# Patient Record
Sex: FEMALE | Race: WHITE | NOT HISPANIC OR LATINO | Employment: FULL TIME | URBAN - METROPOLITAN AREA
[De-identification: names, ages, dates, MRNs, and addresses within clinical notes are randomized per-mention and may not be internally consistent; named-entity substitution may affect disease eponyms.]

---

## 2024-10-28 ENCOUNTER — OFFICE VISIT (OUTPATIENT)
Dept: OBGYN CLINIC | Facility: CLINIC | Age: 39
End: 2024-10-28

## 2024-10-28 VITALS
HEART RATE: 81 BPM | SYSTOLIC BLOOD PRESSURE: 139 MMHG | HEIGHT: 64 IN | BODY MASS INDEX: 33.43 KG/M2 | DIASTOLIC BLOOD PRESSURE: 94 MMHG | WEIGHT: 195.8 LBS

## 2024-10-28 DIAGNOSIS — F17.200 TOBACCO USE DISORDER: ICD-10-CM

## 2024-10-28 DIAGNOSIS — F10.91 ALCOHOL USE, UNSPECIFIED, IN REMISSION: ICD-10-CM

## 2024-10-28 DIAGNOSIS — F41.1 GENERALIZED ANXIETY DISORDER WITH PANIC ATTACKS: ICD-10-CM

## 2024-10-28 DIAGNOSIS — F12.90 MARIJUANA USE: Primary | ICD-10-CM

## 2024-10-28 DIAGNOSIS — F41.0 GENERALIZED ANXIETY DISORDER WITH PANIC ATTACKS: ICD-10-CM

## 2024-10-28 DIAGNOSIS — F33.1 MDD (MAJOR DEPRESSIVE DISORDER), RECURRENT EPISODE, MODERATE (HCC): ICD-10-CM

## 2024-10-28 DIAGNOSIS — F15.11 HISTORY OF METHAMPHETAMINE ABUSE (HCC): ICD-10-CM

## 2024-10-28 PROCEDURE — NC001 PR NO CHARGE: Performed by: PSYCHIATRY & NEUROLOGY

## 2024-10-28 RX ORDER — BUPROPION HYDROCHLORIDE 300 MG/1
300 TABLET ORAL EVERY MORNING
Qty: 30 TABLET | Refills: 1 | Status: SHIPPED | OUTPATIENT
Start: 2024-11-21 | End: 2025-01-20

## 2024-10-28 RX ORDER — CLONIDINE HYDROCHLORIDE 0.1 MG/1
0.1 TABLET ORAL EVERY 12 HOURS SCHEDULED
Qty: 60 TABLET | Refills: 1 | Status: SHIPPED | OUTPATIENT
Start: 2024-10-28 | End: 2024-12-27
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
## 2024-12-23 ENCOUNTER — OFFICE VISIT (OUTPATIENT)
Dept: OBGYN CLINIC | Facility: CLINIC | Age: 39
End: 2024-12-23

## 2024-12-23 VITALS
WEIGHT: 189.8 LBS | BODY MASS INDEX: 32.4 KG/M2 | SYSTOLIC BLOOD PRESSURE: 143 MMHG | DIASTOLIC BLOOD PRESSURE: 88 MMHG | HEIGHT: 64 IN | HEART RATE: 84 BPM

## 2024-12-23 DIAGNOSIS — F17.200 TOBACCO USE DISORDER: ICD-10-CM

## 2024-12-23 DIAGNOSIS — F10.91 ALCOHOL USE, UNSPECIFIED, IN REMISSION: ICD-10-CM

## 2024-12-23 DIAGNOSIS — F41.0 GENERALIZED ANXIETY DISORDER WITH PANIC ATTACKS: ICD-10-CM

## 2024-12-23 DIAGNOSIS — F41.1 GENERALIZED ANXIETY DISORDER WITH PANIC ATTACKS: ICD-10-CM

## 2024-12-23 DIAGNOSIS — F15.11 HISTORY OF METHAMPHETAMINE ABUSE (HCC): ICD-10-CM

## 2024-12-23 DIAGNOSIS — F33.1 MDD (MAJOR DEPRESSIVE DISORDER), RECURRENT EPISODE, MODERATE (HCC): Primary | ICD-10-CM

## 2024-12-23 DIAGNOSIS — F12.90 MARIJUANA USE: ICD-10-CM

## 2024-12-23 DIAGNOSIS — G47.00 INSOMNIA, UNSPECIFIED TYPE: ICD-10-CM

## 2024-12-23 PROCEDURE — NC001 PR NO CHARGE: Performed by: STUDENT IN AN ORGANIZED HEALTH CARE EDUCATION/TRAINING PROGRAM

## 2024-12-23 RX ORDER — CLONIDINE HYDROCHLORIDE 0.1 MG/1
0.1 TABLET ORAL DAILY
Qty: 30 TABLET | Refills: 1 | Status: SHIPPED | OUTPATIENT
Start: 2024-12-23 | End: 2025-02-21

## 2024-12-23 RX ORDER — HYDROXYZINE PAMOATE 25 MG/1
25 CAPSULE ORAL
Qty: 10 CAPSULE | Refills: 1 | Status: SHIPPED | OUTPATIENT
Start: 2024-12-23

## 2024-12-23 RX ORDER — BUPROPION HYDROCHLORIDE 450 MG/1
450 TABLET, FILM COATED, EXTENDED RELEASE ORAL EVERY MORNING
Qty: 30 TABLET | Refills: 1 | Status: SHIPPED | OUTPATIENT
Start: 2024-12-23 | End: 2025-02-21

## 2024-12-23 NOTE — PROGRESS NOTES
MEDICATION MANAGEMENT NOTE        [facility]      Name and Date of Birth:  Sofia Espinoza 39 y.o. 1985 MRN: [id number]    Date of Visit: December 23, 2024    Reason for Visit: Follow-up visit regarding medication management     _____________________________    Assessment & Plan   Sofia Espinoza is a 39 y.o. female, /[employer] with prior psychiatric diagnoses of ***; ***prior hospitalizations and *** prior suicide attempts, with suicide risk factors including {[facility] op suicide risk factors:45343}; and medical history including ***. Sofia was personally seen and evaluated today at the [facility] outpatient clinic for follow-up regarding medication management.     On assessment, Sofia Espinoza *** Patient is in agreement with the treatment plan as detailed below, and agrees to call the office with any concerns or side effects between appointments.     DSM-5 Diagnoses/Visit Diagnoses:     No diagnosis found.***    Treatment Recommendations/Precautions:  Assessment & Plan     Continue Wellbutrin 300 mg XL for mood, smoking cessation, and off label use for ADHD symptoms. Recommended to continue to monitor BP at home (reports typically 140/80s at home) and follow up with PCP ASAP as well as side effect of increasing blood pressures. Patient continues to work towards avoid drinking alcohol   IBM sent to PCP regarding elevated pressures and potential for Wellbutrin to increase pressures - patient reports monitoring at home and is typically 130s/80s and that she needs to follow up with PCP  PARQ completed including induction of bryson, decreased seizure threshold and risk with alcohol or electrolyte disturbances, headaches, hypertension and cardiovascular effects, GI distress, weight loss, agitation/activation, dizziness, tremor, anxiety, potential for drug interactions, and others.  Recommend Melatonin over the counter 3-5 mg for  "sleep; previously was using 5 mg at rehab for sleep  Increase Clonidine to 0.1 mg BID for anxiety/ADHD symptoms  PARQ completed including sedation, headache, dizziness, hypotension/postural hypotension, and risks associated with sudden discontinuation, among others  Referral to smoking/tobacco cessation program with [facility], advised to contact insurance for coverage  Neuropsychology resources for ADHD testing provided by previous psychiatric provider  Routine labs ordered, follow up  Advised to contact insurance for coverage  Most Recent EKG on 02/06/24: Qtc 453 ms  Medication management follow-up in 8 weeks or sooner if needed.  Continue psychotherapy with own therapist  Aware of need to follow up with family physician for medical issues  Aware of 24 hour and weekend coverage for urgent situations accessed by calling [facility] main practice number  Risks, benefits, and possible side effects of medications explained to Sofia and she verbalizes understanding and agreement for treatment.  Labs most recently obtained 10/08/24, reviewed - scanned into chart  Labs most recently obtained ***, reviewed.   Follow up in *** for medication management  Follow up with PCP for medical issues and ongoing care  {EFO AMB FOLLOW UP RECOMMENDATIONS:37658::\"Aware of 24 hour and weekend coverage for urgent situations accessed by calling [facility] main practice number\"}    Individual psychotherapy provided: {EFO AMB Psychotherapy:05490::\"No\"}     Medications Risks/Benefits:      Risks, Benefits And Possible Side Effects Of Medications:    {EFO AMB RISKS/BENEFITS MEDICATIONS:08720}     Controlled Medication Discussion:     {EFO AMB PSYCH CONTROLLED MED DISCUSSION:82547}    Medical Decision Making / Counseling / Coordination of Care:  The following interventions are recommended: {KR Outpt Interventions:17400}.  Although patient's acute lethality risk is LOW, long-term/chronic lethality risk " "is mildly elevated given the risk factors listed above. However, at the current moment, Sofia is future-oriented, forward-thinking, and demonstrates ability to act in a self-preserving manner as evidenced by volitionally seeking psychiatric evaluation and treatment today. To mitigate future risk, patient should adhere to treatment recommendations, avoid alcohol/illicit substance use, utilize community-based resources and familiar support, and prioritize mental health treatment. The diagnosis and treatment plan were reviewed with the patient. Risks, benefits, and alternatives to treatment were discussed. The importance of medication and treatment compliance was reviewed with the patient.     _____________________________________________    History of Present Illness     Chief Complaint: \"***\"    SUBJECTIVE:    Sofia Espinoza is a 39 y.o. female, possessing a past psychiatric history significant for ***, who was personally seen and evaluated at the [facility] outpatient clinic *** for follow-up regarding medication management. At their last visit, the plan was ***.    Sofia states that since their previous psychiatric appointment with this writer, ***.     Presently, patient denies suicidal/homicidal ideation in addition to thoughts of self-injury.  At conclusion of evaluation, patient is amenable to the recommendations of this writer including: continue psychotropic medications as prescribed***.  Also, patient is amenable to calling/contacting the outpatient office including this writer if any acute adverse effects of their medication regimen arise in addition to any comments or concerns pertaining to their psychiatric management.  Patient is amenable to calling/contacting crisis and/or attending to the nearest emergency department if their clinical condition deteriorates to assure their safety and stability, stating that they are able to appropriately confide in their *** regarding their " "psychiatric state.    Current Rating Scores:     {EFO AMB Rating Scales:42428}    Psychiatric Review Of Systems:  Unchanged information from this writer's previous assessment is copied and italicized; information that has changed is bolded.    Appetite: {EFO Yes/No/Increased/Decreased:15091}  Adverse eating: {EFO Eating Disorder:23816}  Weight changes: {EFO Yes/No/Increased/Decreased:75328}  Insomnia/sleeplessness: {EFO Yes/No/Increased/Decreased:71679}  Fatigue/anergy: {EFO Yes/No/Increased/Decreased:98501}  Anhedonia/lack of interest: {EFO Yes/No/Increased/Decreased:84147}  Attention/concentration: {EFO Yes/No/Increased/Decreased:77559}  Psychomotor agitation/retardation: {EFO Yes/No/Increased/Decreased:57665}  Somatic symptoms: {YES/NO:29239::\"no\"}  Anxiety/panic attack: {EFO Anxiety Symptoms:31442}  Camila/hypomania: {EFO Camila history:86691}  Hopelessness/helplessness/worthlessness: {YES/NO:06455::\"no\"}  Self-injurious behavior/high-risk behavior: {EFO Self Mutilation:19451}  Suicidal ideation: {EFO Suicidal Ideation:40451}  Homicidal ideation: {EFO Homicidal ideation:34326}  Auditory hallucinations: {EFO auditory hallucinations history:54230}  Visual hallucinations: {EFO visual hallucinations:22818}  Other perceptual disturbances: {YES/NO:08813::\"no\"}  Delusional thinking: {EFO Delusional Thinkin}  Obsessive/compulsive symptoms: {EFO Obsessions:81927}    Review Of Systems:      Constitutional {EFO Amb Constitutional ROS:49951::\"negative\"}   ENT {EFO Amb ENT ROS:47495::\"negative\"}   Cardiovascular {EFO Amb Cardiovascular ROS:76647::\"negative\"}   Respiratory {EFO Amb Respiratory ROS:65062::\"negative\"}   Gastrointestinal {EFO Amb Gastrointestinal ROS:98589::\"negative\"}   Genitourinary {EFO Amb Genitourinary ROS:74027::\"negative\"}   Musculoskeletal {EFO Amb Musculosceletal ROS:34834::\"negative\"}   Integumentary {EFO Amb Integumentary ROS:55563::\"negative\"}   Neurological {EFO Amb Neurological " "ROS:72892::\"negative\"}   Endocrine {EFO Amb Endocrine ROS:64629::\"negative\"}   Other Symptoms {EFO Amb Other Symptoms ROS:00147::\"none\",\"all other systems are negative\"}     Objective    OBJECTIVE:     Visit Vitals  /88 (BP Location: Right arm, Patient Position: Sitting)   Pulse 84   Ht 5' 4\" (1.626 m)   Wt 86.1 kg (189 lb 12.8 oz)   LMP 12/13/2024 (Exact Date)   BMI 32.58 kg/m²   OB Status Unknown   Smoking Status Some Days   BSA 1.91 m²      Wt Readings from Last 6 Encounters:   12/23/24 86.1 kg (189 lb 12.8 oz)   10/28/24 88.8 kg (195 lb 12.8 oz)   10/02/24 91.2 kg (201 lb)   09/16/24 92.6 kg (204 lb 2.3 oz)   08/12/24 96.3 kg (212 lb 3.2 oz)   08/06/24 95 kg (209 lb 6.4 oz)        Past Medical History:   Diagnosis Date    Abnormal Pap smear of cervix     [facility]; 6/2023 NILM pap/neg HPV    Anxiety     Depression     hx suicide attempt 2014    Gonorrhea 2024    Herpes 11/2014    Inable to confirm      Past Surgical History:   Procedure Laterality Date    ORIF DISTAL RADIUS FRACTURE Left 2005    WISDOM TOOTH EXTRACTION  2002       Meds/Allergies    Allergies   Allergen Reactions    Doxycycline Photosensitivity     Photosensitivity noted on dorsal forearms bilaterally and dorsal hands/fingers     Current Outpatient Medications   Medication Instructions    buPROPion (WELLBUTRIN XL) 300 mg, Oral, Every morning    cetirizine-pseudoephedrine (ZyrTEC-D) 5-120 MG per tablet 1 tablet, 2 times daily    cloNIDine (CATAPRES) 0.1 mg, Oral, Every 12 hours scheduled    clotrimazole-betamethasone (LOTRISONE) 1-0.05 % cream Topical, 2 times daily    multivitamin (THERAGRAN) TABS 1 tablet, Daily    Zepbound 2.5 MG/0.5ML auto-injector INJECT 2.5MG UNDER THE SKIN EVERY WEEK           Mental Status Exam:    Appearance {EFO AMB EXAM; GENERAL PSYCH:89295::\"age appropriate\",\"casually dressed\"}   Behavior {EFO AMB Exam; behavior:39833::\"cooperative\",\"calm\"}   Speech {EFO AMB EXAM; speech:49096::\"normal " "rate\",\"normal volume\",\"normal pitch\"}   Mood {EFO EXAM; MOOD LESS/MORE:18099}   Affect {EFO AMB AFFECT:60090::\"normal range and intensity\",\"appropriate\"}   Thought Processes {EFO AMB THOUGHT PROCESS:56787::\"organized\",\"goal directed\"}   Associations {EFO AMB THOUGHT ASSOCIATIONS:20172::\"intact associations\"}   Thought Content {EFO AMB Exam; thought content:17321::\"no overt delusions\"}   Perceptual Disturbances: {-MSE-PD:37403}   Abnormal Thoughts  Risk Potential { MSE RISK:74476}   Orientation {EFO AMB ORIENTED/DISORIENTED:70408}   Memory {EFO AMB EXAM; PSYCH COGNITION:37190::\"recent and remote memory grossly intact\"}   Consciousness {EFO AMB Consciousness:85548::\"alert\",\"awake\"}   Attention Span Concentration Span {EFO AMB EXAM ATTENTION:31086::\"attention span and concentration are age appropriate\"}   Intellect {EFO AMB INTELLECTUAL FUNC:87674::\"appears to be of average intelligence\"}   Insight {EFO AMB EXAM; PSYCH INSIGHT/JUDGEMENT:54829::\"intact\"}   Judgement {EFO AMB EXAM; PSYCH INSIGHT/JUDGEMENT:64458::\"intact\"}   Muscle Strength and  Gait {O AMB PSYCH MUSCLE STRENGHT:07925::\"normal muscle strength and normal muscle tone\",\"normal gait and normal balance\"}   Motor Activity {EFO SL IP Psych Motor Activity:51280}   Language {O AMB PSYCH MENTAL STATUS LANGUAGE:64281::\"no difficulty naming common objects\",\"no difficulty repeating a phrase\",\"no difficulty writing a sentence\"}   Fund of Knowledge {EFO AMB PSYCH MENTAL STATUS FUND OF KNOWLEDGE:84734::\"adequate knowledge of current events\",\"adequate fund of knowledge regarding past history\",\"adequate fund of knowledge regarding vocabulary \"}     Laboratory Results: {EFO I have reviewed all laboratory results:01764::\"I have personally reviewed all pertinent laboratory/tests results\"}    Office Visit on 10/02/2024   Component Date Value Ref Range Status    Chlamydia trachomatis, TEZ 10/02/2024 Negative  Negative Final    Neisseria gonorrhoeae, TEZ 10/02/2024 " Negative  Negative Final    Trichomonas vaginosis 10/02/2024 Negative  Negative Final    WET MOUNT 10/02/2024 yeast   Final    Yeast, Wet Prep 10/02/2024 budding yeast   Final    pH 10/02/2024 4.5   Final    Clue Cells 10/02/2024 neg   Final    Trich, Wet Prep 10/02/2024 neg   Final   Admission on 09/16/2024, Discharged on 09/16/2024   Component Date Value Ref Range Status    EXT Preg Test, Ur 09/16/2024 Negative   Final    Control 09/16/2024 Valid   Final   Appointment on 08/01/2024   Component Date Value Ref Range Status    HIV-1 p24 Antigen 08/01/2024 Non-Reactive  Non-Reactive Final    HIV-1 Antibody 08/01/2024 Non-Reactive  Non-Reactive Final    HIV-2 Antibody 08/01/2024 Non-Reactive  Non-Reactive Final    HIV Ag-Ab 5th Gen 08/01/2024 Non-Reactive  Non-Reactive Final    A Non-Reactive test result does not preclude the possibility of exposure or infection with HIV-1 and/or HIV-2.  Non-Reactive results can occur if the quantity of marker present is below the detection limits or is not present during the stage of disease in which a sample is collected. Repeat testing should be considered where there is clinical suspicion of infection.       Hepatitis C Ab 08/01/2024 Non-reactive  Non-Reactive Final    Syphilis Total Antibody 08/01/2024 Non-reactive  Non-Reactive Final    No serological evidence of infection with T. pallidum.  Early or incubating syphilis infection cannot be excluded.  Consider repeat testing based on clinical suspicion.    Hepatitis B Surface Ag 08/01/2024 Non-reactive  Non-Reactive Final   Annual Exam on 08/01/2024   Component Date Value Ref Range Status    N gonorrhoeae, DNA Probe 08/01/2024 Positive (A)  Negative Final    Chlamydia trachomatis, DNA Probe 08/01/2024 Negative  Negative Final   Office Visit on 07/12/2024   Component Date Value Ref Range Status     RAPID STREP A 07/12/2024 Negative  Negative Final             ___________________________________    History Review: The following  "portions of the patient's history were reviewed and updated as appropriate: {history reviewed:20406::\"allergies\",\"current medications\",\"past family history\",\"past medical history\",\"past social history\",\"past surgical history\",\"problem list\"}.    Unchanged information from this writer's previous assessment is copied and italicized; information that has changed is bolded.    ***.  ___________________________________      Visit Time    Visit Start Time: ***  Visit Stop Time: ***  Total Visit Duration: {Psych Total Visit Time:11765}    Tamica Meier DO   12/23/24   "
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
## 2025-01-11 ENCOUNTER — HOSPITAL ENCOUNTER (EMERGENCY)
Facility: HOSPITAL | Age: 40
Discharge: HOME | End: 2025-01-11
Attending: EMERGENCY MEDICINE
Payer: COMMERCIAL

## 2025-01-11 VITALS
OXYGEN SATURATION: 100 % | HEART RATE: 77 BPM | RESPIRATION RATE: 16 BRPM | TEMPERATURE: 98.8 F | DIASTOLIC BLOOD PRESSURE: 82 MMHG | SYSTOLIC BLOOD PRESSURE: 135 MMHG

## 2025-01-11 DIAGNOSIS — N92.0 MENORRHAGIA: Primary | ICD-10-CM

## 2025-01-11 LAB
ANION GAP SERPL CALCULATED.3IONS-SCNC: 6 MMOL/L (ref 4–13)
BASOPHILS # BLD AUTO: 0.1 THOUSANDS/ΜL (ref 0–0.1)
BASOPHILS NFR BLD AUTO: 1 % (ref 0–1)
BUN SERPL-MCNC: 18 MG/DL (ref 5–25)
CALCIUM SERPL-MCNC: 8.8 MG/DL (ref 8.4–10.2)
CHLORIDE SERPL-SCNC: 106 MMOL/L (ref 96–108)
CO2 SERPL-SCNC: 26 MMOL/L (ref 21–32)
CREAT SERPL-MCNC: 0.86 MG/DL (ref 0.6–1.3)
EOSINOPHIL # BLD AUTO: 0.31 THOUSAND/ΜL (ref 0–0.61)
EOSINOPHIL NFR BLD AUTO: 3 % (ref 0–6)
ERYTHROCYTE [DISTWIDTH] IN BLOOD BY AUTOMATED COUNT: 12.3 % (ref 11.6–15.1)
EXT PREGNANCY TEST URINE: NEGATIVE
EXT. CONTROL: NORMAL
GFR SERPL CREATININE-BSD FRML MDRD: 86 ML/MIN/1.73SQ M
GLUCOSE SERPL-MCNC: 86 MG/DL (ref 65–140)
HCT VFR BLD AUTO: 36.4 % (ref 34.8–46.1)
HGB BLD-MCNC: 12.4 G/DL (ref 11.5–15.4)
IMM GRANULOCYTES # BLD AUTO: 0.02 THOUSAND/UL (ref 0–0.2)
IMM GRANULOCYTES NFR BLD AUTO: 0 % (ref 0–2)
LYMPHOCYTES # BLD AUTO: 5.33 THOUSANDS/ΜL (ref 0.6–4.47)
LYMPHOCYTES NFR BLD AUTO: 45 % (ref 14–44)
MCH RBC QN AUTO: 29.5 PG (ref 26.8–34.3)
MCHC RBC AUTO-ENTMCNC: 34.1 G/DL (ref 31.4–37.4)
MCV RBC AUTO: 87 FL (ref 82–98)
MONOCYTES # BLD AUTO: 0.67 THOUSAND/ΜL (ref 0.17–1.22)
MONOCYTES NFR BLD AUTO: 6 % (ref 4–12)
NEUTROPHILS # BLD AUTO: 5.32 THOUSANDS/ΜL (ref 1.85–7.62)
NEUTS SEG NFR BLD AUTO: 45 % (ref 43–75)
NRBC BLD AUTO-RTO: 0 /100 WBCS
PLATELET # BLD AUTO: 399 THOUSANDS/UL (ref 149–390)
PMV BLD AUTO: 9 FL (ref 8.9–12.7)
POTASSIUM SERPL-SCNC: 3.4 MMOL/L (ref 3.5–5.3)
RBC # BLD AUTO: 4.21 MILLION/UL (ref 3.81–5.12)
SODIUM SERPL-SCNC: 138 MMOL/L (ref 135–147)
WBC # BLD AUTO: 11.75 THOUSAND/UL (ref 4.31–10.16)

## 2025-01-11 PROCEDURE — 36415 COLL VENOUS BLD VENIPUNCTURE: CPT | Performed by: PHYSICIAN ASSISTANT

## 2025-01-11 PROCEDURE — 99283 EMERGENCY DEPT VISIT LOW MDM: CPT

## 2025-01-11 PROCEDURE — 99284 EMERGENCY DEPT VISIT MOD MDM: CPT | Performed by: PHYSICIAN ASSISTANT

## 2025-01-11 PROCEDURE — 81025 URINE PREGNANCY TEST: CPT | Performed by: EMERGENCY MEDICINE

## 2025-01-11 PROCEDURE — 96365 THER/PROPH/DIAG IV INF INIT: CPT

## 2025-01-11 PROCEDURE — 80048 BASIC METABOLIC PNL TOTAL CA: CPT | Performed by: PHYSICIAN ASSISTANT

## 2025-01-11 PROCEDURE — 96375 TX/PRO/DX INJ NEW DRUG ADDON: CPT

## 2025-01-11 PROCEDURE — 85025 COMPLETE CBC W/AUTO DIFF WBC: CPT | Performed by: PHYSICIAN ASSISTANT

## 2025-01-11 RX ORDER — KETOROLAC TROMETHAMINE 30 MG/ML
15 INJECTION, SOLUTION INTRAMUSCULAR; INTRAVENOUS ONCE
Status: COMPLETED | OUTPATIENT
Start: 2025-01-11 | End: 2025-01-11

## 2025-01-11 RX ADMIN — SODIUM CHLORIDE, SODIUM LACTATE, POTASSIUM CHLORIDE, AND CALCIUM CHLORIDE 1000 ML: .6; .31; .03; .02 INJECTION, SOLUTION INTRAVENOUS at 21:17

## 2025-01-11 RX ADMIN — KETOROLAC TROMETHAMINE 15 MG: 30 INJECTION, SOLUTION INTRAMUSCULAR; INTRAVENOUS at 21:16

## 2025-01-12 NOTE — DISCHARGE INSTRUCTIONS
Can take 600 mg of Motrin every 6 hours to help with bleeding or cramping.  Follow up with OB/GYN.    Please refer to the attached information for strict return instructions.  If symptoms worsen or new symptoms develop please return to the ER.  Please follow-up with your primary care physician for re-evaluation of symptoms.

## 2025-01-12 NOTE — ED PROVIDER NOTES
Time reflects when diagnosis was documented in both MDM as applicable and the Disposition within this note       Time User Action Codes Description Comment    2025 10:25 PM Fatimah Blairsea Add [N92.0] Menorrhagia           ED Disposition       ED Disposition   Discharge    Condition   Stable    Date/Time   Sat 2025 10:24 PM    Comment   Sofia Chayoelvia Espinoza discharge to home/self care.             Assessment & Plan       Medical Decision Making      DDx including but not limited to: ectopic pregnancy, threatened , missed , incomplete , anemia, coagulopathy, DUB, tumor, retained products of conception, PCOS    Patient presenting to ED for evaluation of heavy vaginal bleeding starting this morning.  She denies any pain associated with vaginal bleeding.  She states she is passing large clots.  Will order CBC for evaluation of anemia, BMP for evaluation of kidney function and electrolyte abnormalities, point-of-care pregnancy test was negative.  Will have patient change into one of our large pads to better monitor bleeding.    Patient changed pad while here, minimal bleeding noted.  She did have some clots she states.  Recommend patient continue to use pads to monitor bleeding.  Recommend follow-up with OB/GYN.  Continue with ibuprofen 600 mg every 6 hours for pain and bleeding.    Prior to discharge, discharge instructions were discussed with patient at bedside. Patient was provided both verbal and written instructions. Patient is understanding of the discharge instructions and is agreeable to plan of care. Return precautions were discussed with patient bedside, patient verbalized understanding of signs and symptoms that would necessitate return to the ED. All questions were answered. Patient was comfortable with the plan of care and discharged to home.     Dispo: discharge home with follow up to OB/GYN. Patient stable, in no acute distress and non-toxic at discharge.    Problems  Addressed:  Menorrhagia: acute illness or injury    Amount and/or Complexity of Data Reviewed  Labs: ordered. Decision-making details documented in ED Course.    Risk  Prescription drug management.        ED Course as of 01/11/25 2336   Sat Jan 11, 2025 2054 PREGNANCY TEST URINE: Negative   2126 Hemoglobin: 12.4  No anemia       Medications   ketorolac (TORADOL) injection 15 mg (15 mg Intravenous Given 1/11/25 2116)   lactated ringers bolus 1,000 mL (0 mL Intravenous Stopped 1/11/25 2238)       ED Risk Strat Scores                          History of Present Illness       Chief Complaint   Patient presents with    Vaginal Bleeding     Patient menstrual cycle started 3 days ago but today around 10 am started with heavier bleeding and blood clots. She does not usually bleed this heavy. There is a chance of pregnancy.       Past Medical History:   Diagnosis Date    Abnormal Pap smear of cervix     2019 [facility]n [facility]; 6/2023 NILM pap/neg HPV    Anxiety     Depression     hx suicide attempt 2014    Gonorrhea 2024    Herpes 11/2014    Inable to confirm      Past Surgical History:   Procedure Laterality Date    ORIF DISTAL RADIUS FRACTURE Left 2005    WISDOM TOOTH EXTRACTION  2002      Family History   Problem Relation Age of Onset    Lupus Mother     Hypertension Father     Cancer Maternal Grandfather         leukemia    Breast cancer Neg Hx     Colon cancer Neg Hx     Ovarian cancer Neg Hx       Social History     Tobacco Use    Smoking status: Some Days     Current packs/day: 0.25     Average packs/day: 0.3 packs/day for 3.0 years (0.8 ttl pk-yrs)     Types: Cigarettes    Smokeless tobacco: Never   Vaping Use    Vaping status: Never Used   Substance Use Topics    Alcohol use: Not Currently     Alcohol/week: 4.0 standard drinks of alcohol     Types: 4 Shots of liquor per week     Comment: Recently quit and would like to quit again    Drug use: Yes     Types: Amphetamines, Marijuana     Comment: Currently  use medical marijuana      E-Cigarette/Vaping    E-Cigarette Use Never User       E-Cigarette/Vaping Substances    Nicotine No     THC No     CBD No     Flavoring No     Other No     Unknown No       I have reviewed and agree with the history as documented.     This is a 39-year-old female presenting to the ED for evaluation of heavy vaginal bleeding.  She states that around 10 AM this morning she started with heavy vaginal bleeding, passing large palm sized clots.  She states bleeding is bright red.  She states that she has never had bleeding like this in the past.  She started on normal menstrual cycle 3 days ago.  She states there is a possibility of pregnancy.  She denies any pelvic pain/cramping.  She denies any back pain.  She denies any dysuria, frequency or urgency.  She states that she is changing a tampon and pad at least once every hour.      History provided by:  Patient   used: No        Review of Systems   Constitutional:  Negative for fever.   Gastrointestinal:  Negative for abdominal pain, diarrhea, nausea and vomiting.   Genitourinary:  Positive for vaginal bleeding. Negative for dysuria, flank pain, pelvic pain and urgency.   Musculoskeletal:  Negative for back pain.   Neurological:  Negative for dizziness and light-headedness.   All other systems reviewed and are negative.          Objective       ED Triage Vitals   Temperature Pulse Blood Pressure Respirations SpO2 Patient Position - Orthostatic VS   01/11/25 2015 01/11/25 2024 01/11/25 2015 01/11/25 2015 01/11/25 2015 01/11/25 2015   98.8 °F (37.1 °C) 83 (!) 171/100 17 99 % Sitting      Temp Source Heart Rate Source BP Location FiO2 (%) Pain Score    01/11/25 2015 01/11/25 2024 01/11/25 2015 -- 01/11/25 2015    Oral Monitor Right arm  No Pain      Vitals      Date and Time Temp Pulse SpO2 Resp BP Pain Score FACES Pain Rating User   01/11/25 2144 -- 77 100 % 16 135/82 No Pain -- CFW   01/11/25 2116 -- -- -- -- -- 1 -- CFW    01/11/25 2024 -- 83 -- -- -- -- -- CFW   01/11/25 2015 98.8 °F (37.1 °C) -- 99 % 17 171/100 No Pain -- CFW            Physical Exam  Vitals reviewed.   Constitutional:       General: She is not in acute distress.     Appearance: Normal appearance. She is well-developed and well-groomed. She is not ill-appearing.   HENT:      Head: Normocephalic and atraumatic.      Right Ear: External ear normal.      Left Ear: External ear normal.      Nose: Nose normal.   Eyes:      General: No scleral icterus.     Conjunctiva/sclera: Conjunctivae normal.   Cardiovascular:      Rate and Rhythm: Normal rate and regular rhythm.   Pulmonary:      Effort: Pulmonary effort is normal.      Breath sounds: No stridor.   Abdominal:      General: Abdomen is flat. There is no distension.      Palpations: Abdomen is soft.      Tenderness: There is no abdominal tenderness. There is no right CVA tenderness, left CVA tenderness, guarding or rebound.   Musculoskeletal:         General: No deformity. Normal range of motion.      Cervical back: Normal range of motion.   Skin:     Coloration: Skin is not jaundiced or pale.      Findings: No lesion or rash.   Neurological:      Mental Status: She is alert and oriented to person, place, and time.   Psychiatric:         Mood and Affect: Mood normal.         Behavior: Behavior normal. Behavior is cooperative.         Results Reviewed       Procedure Component Value Units Date/Time    Basic metabolic panel [[id number]]  (Abnormal) Collected: 01/11/25 2116    Lab Status: Final result Specimen: Blood from Arm, Right Updated: 01/11/25 2138     Sodium 138 mmol/L      Potassium 3.4 mmol/L      Chloride 106 mmol/L      CO2 26 mmol/L      ANION GAP 6 mmol/L      BUN 18 mg/dL      Creatinine 0.86 mg/dL      Glucose 86 mg/dL      Calcium 8.8 mg/dL      eGFR 86 ml/min/1.73sq m     Narrative:      National Kid[employer] Disease Foundation guidelines for Chronic Kidney Disease (CKD):     Stage 1 with normal or high GFR  (GFR > 90 mL/min/1.73 square meters)    Stage 2 Mild CKD (GFR = 60-89 mL/min/1.73 square meters)    Stage 3A Moderate CKD (GFR = 45-59 mL/min/1.73 square meters)    Stage 3B Moderate CKD (GFR = 30-44 mL/min/1.73 square meters)    Stage 4 Severe CKD (GFR = 15-29 mL/min/1.73 square meters)    Stage 5 End Stage CKD (GFR <15 mL/min/1.73 square meters)  Note: GFR calculation is accurate only with a steady state creatinine    CBC and differential [[id number]]  (Abnormal) Collected: 01/11/25 2116    Lab Status: Final result Specimen: Blood from Arm, Right Updated: 01/11/25 2125     WBC 11.75 Thousand/uL      RBC 4.21 Million/uL      Hemoglobin 12.4 g/dL      Hematocrit 36.4 %      MCV 87 fL      MCH 29.5 pg      MCHC 34.1 g/dL      RDW 12.3 %      MPV 9.0 fL      Platelets 399 Thousands/uL      nRBC 0 /100 WBCs      Segmented % 45 %      Immature Grans % 0 %      Lymphocytes % 45 %      Monocytes % 6 %      Eosinophils Relative 3 %      Basophils Relative 1 %      Absolute Neutrophils 5.32 Thousands/µL      Absolute Immature Grans 0.02 Thousand/uL      Absolute Lymphocytes 5.33 Thousands/µL      Absolute Monocytes 0.67 Thousand/µL      Eosinophils Absolute 0.31 Thousand/µL      Basophils Absolute 0.10 Thousands/µL     POCT pregnancy, urine [[id number]]  (Normal) Collected: 01/11/25 2035    Lab Status: Final result Updated: 01/11/25 2036     EXT Preg Test, Ur Negative     Control Valid            No orders to display       Procedures    ED Medication and Procedure Management   Prior to Admission Medications   Prescriptions Last Dose Informant Patient Reported? Taking?   Zepbound 2.5 MG/0.5ML auto-injector   Yes No   Sig: INJECT 2.5MG UNDER THE SKIN EVERY WEEK   buPROPion (FORFIVO XL) 450 MG 24 hr tablet   No No   Sig: Take 1 tablet (450 mg total) by mouth every morning   cetirizine-pseudoephedrine (ZyrTEC-D) 5-120 MG per tablet   Yes No   Sig: Take 1 tablet by mouth 2 (two) times a day   cloNIDine (Catapres) 0.1 mg tablet    No No   Sig: Take 1 tablet (0.1 mg total) by mouth in the morning   clotrimazole-betamethasone (LOTRISONE) 1-0.05 % cream   No No   Sig: Apply topically 2 (two) times a day   Patient not taking: Reported on 12/23/2024   hydrOXYzine pamoate (VISTARIL) 25 mg capsule   No No   Sig: Take 1 capsule (25 mg total) by mouth daily at bedtime as needed for itching   multivitamin (THERAGRAN) TABS  Self Yes No   Sig: Take 1 tablet by mouth daily      Facility-Administered Medications: None     Discharge Medication List as of 1/11/2025 10:26 PM        CONTINUE these medications which have NOT CHANGED    Details   buPROPion (FORFIVO XL) 450 MG 24 hr tablet Take 1 tablet (450 mg total) by mouth every morning, Starting Mon 12/23/2024, Until Fri 2/21/2025, Normal      cetirizine-pseudoephedrine (ZyrTEC-D) 5-120 MG per tablet Take 1 tablet by mouth 2 (two) times a day, Historical Med      cloNIDine (Catapres) 0.1 mg tablet Take 1 tablet (0.1 mg total) by mouth in the morning, Starting Mon 12/23/2024, Until Fri 2/21/2025, Normal      clotrimazole-betamethasone (LOTRISONE) 1-0.05 % cream Apply topically 2 (two) times a day, Starting Wed 10/2/2024, Normal      hydrOXYzine pamoate (VISTARIL) 25 mg capsule Take 1 capsule (25 mg total) by mouth daily at bedtime as needed for itching, Starting Mon 12/23/2024, Normal      multivitamin (THERAGRAN) TABS Take 1 tablet by mouth daily, Historical Med      Zepbound 2.5 MG/0.5ML auto-injector INJECT 2.5MG UNDER THE SKIN EVERY WEEK, Historical Med           No discharge procedures on file.  ED SEPSIS DOCUMENTATION   Time reflects when diagnosis was documented in both MDM as applicable and the Disposition within this note       Time User Action Codes Description Comment    1/11/2025 10:25 PM Shantelle Blair Add [N92.0] Menorrhagia                  Shantelle Blair PA-C  01/11/25 1962

## 2025-02-14 ENCOUNTER — OFFICE VISIT (OUTPATIENT)
Dept: FAMILY MEDICINE CLINIC | Facility: CLINIC | Age: 40
End: 2025-02-14
Payer: COMMERCIAL

## 2025-02-14 VITALS
WEIGHT: 179 LBS | BODY MASS INDEX: 30.56 KG/M2 | HEART RATE: 77 BPM | DIASTOLIC BLOOD PRESSURE: 62 MMHG | SYSTOLIC BLOOD PRESSURE: 126 MMHG | TEMPERATURE: 98.7 F | OXYGEN SATURATION: 99 % | HEIGHT: 64 IN

## 2025-02-14 DIAGNOSIS — F32.2 SEVERE DEPRESSION (HCC): ICD-10-CM

## 2025-02-14 DIAGNOSIS — L65.9 HAIR LOSS: ICD-10-CM

## 2025-02-14 DIAGNOSIS — N92.6 IRREGULAR MENSES: Primary | ICD-10-CM

## 2025-02-14 DIAGNOSIS — E66.9 OBESITY (BMI 30-39.9): ICD-10-CM

## 2025-02-14 DIAGNOSIS — Z23 ENCOUNTER FOR IMMUNIZATION: ICD-10-CM

## 2025-02-14 PROCEDURE — 90471 IMMUNIZATION ADMIN: CPT | Performed by: NURSE PRACTITIONER

## 2025-02-14 PROCEDURE — 99214 OFFICE O/P EST MOD 30 MIN: CPT | Performed by: NURSE PRACTITIONER

## 2025-02-14 PROCEDURE — 90656 IIV3 VACC NO PRSV 0.5 ML IM: CPT | Performed by: NURSE PRACTITIONER

## 2025-02-14 RX ORDER — TIRZEPATIDE 12.5 MG/.5ML
12.5 INJECTION, SOLUTION SUBCUTANEOUS WEEKLY
COMMUNITY

## 2025-02-14 NOTE — PROGRESS NOTES
Name: Sofia Espinoza      : 1985      MRN: [id number]  Encounter Provider: LEONEL Jo  Encounter Date: 2025   Encounter department: [facility]  :  Assessment & Plan  Irregular menses  Check labs  Orders:    Vitamin B12    Folate; Future    TSH, 3rd generation    T4, free    Vitamin D 25 hydroxy    Follicle stimulating hormone; Future    Luteinizing hormone; Future    Hair loss  Check labs  Suspect related to medication zepbound  TSH was 0.68 in 2024, she typically is between 1.9-2.5  Will recheck as she is also experiencing worsening anxiety and palpitations  Orders:    Vitamin B12    Folate; Future    TSH, 3rd generation    T4, free    Vitamin D 25 hydroxy    Severe depression (HCC)  Depression Screening Follow-up Plan: Patient's depression screening was positive with a PHQ-9 score of 6. Patient with underlying depression and was advised to continue current medications as prescribed. Continue regular follow-up with their psychologist/therapist/psychiatrist who is managing their mental health condition(s).    Orders:    Vitamin B12    Folate; Future    TSH, 3rd generation    T4, free    Vitamin D 25 hydroxy    Encounter for immunization    Orders:    influenza vaccine preservative-free 0.5 mL IM (Fluzone, Afluria, Fluarix, Flulaval)    Obesity (BMI 30-39.9)    Will continue to order zepbound for her  She will continue on 12.5mg for now just started week 1  Office visit in 2 months for follow up  Call me when you need refill               Depression Screening and Follow-up Plan: Patient's depression screening was positive with a PHQ-9 score of 6.   Continue regular follow-up with their mental health provider who is managing their mental health condition(s). Patient with underlying depression and was advised to continue current medications as prescribed.     Tobacco Cessation Counseling: The patient is sincerely urged to quit consumption of tobacco. She is ready  "to quit tobacco.       History of Present Illness   She start zepbound back in August  She had a local skin reaction last 2 weeks- she rotates spots.  Starting weight was about 210- she is down 35lbs she is using weekly she is up to 12.5mg injections    She has complaints of hair loss  Taking supplements zinc, hair skin nails, womens daily, omega started to help a little bit and now in last week she feels her hair is thinning and other people are using.    She is working with a dietician     LMP 2/8/25            Review of Systems   Constitutional:  Positive for fatigue. Negative for activity change, appetite change, diaphoresis and fever.   HENT: Negative.     Eyes: Negative.    Respiratory:  Negative for cough, choking, chest tightness and shortness of breath.    Cardiovascular:  Positive for palpitations. Negative for chest pain and leg swelling.   Gastrointestinal: Negative.    Endocrine:        Hair loss     Skin: Negative.    Psychiatric/Behavioral:  Positive for dysphoric mood and sleep disturbance. Negative for behavioral problems and suicidal ideas. The patient is nervous/anxious.        Objective   /62   Pulse 77   Temp 98.7 °F (37.1 °C) (Tympanic)   Ht 5' 4\" (1.626 m)   Wt 81.2 kg (179 lb)   SpO2 99%   BMI 30.73 kg/m²      Physical Exam  Vitals and nursing note reviewed.   Constitutional:       General: She is not in acute distress.     Appearance: Normal appearance. She is well-developed.   HENT:      Head: Normocephalic and atraumatic.      Comments: Hair thinning along crown and sides    Eyes:      General: No scleral icterus.     Conjunctiva/sclera: Conjunctivae normal.      Pupils: Pupils are equal, round, and reactive to light.   Neck:      Thyroid: No thyromegaly or thyroid tenderness.   Cardiovascular:      Rate and Rhythm: Normal rate and regular rhythm.      Pulses:           Radial pulses are 2+ on the right side and 2+ on the left side.      Heart sounds: Normal heart sounds. No " murmur heard.  Pulmonary:      Effort: Pulmonary effort is normal. No respiratory distress.      Breath sounds: Normal breath sounds.   Abdominal:      General: Bowel sounds are normal.      Palpations: Abdomen is soft.      Tenderness: There is no abdominal tenderness.   Musculoskeletal:      Cervical back: Neck supple.      Right lower leg: No edema.      Left lower leg: No edema.   Lymphadenopathy:      Cervical: No cervical adenopathy.   Skin:     General: Skin is warm and dry.   Neurological:      Mental Status: She is alert and oriented to person, place, and time.   Psychiatric:         Attention and Perception: Attention normal.         Mood and Affect: Mood is depressed. Affect is tearful.         Speech: Speech normal.         Behavior: Behavior normal. Behavior is cooperative.         Thought Content: Thought content normal. Thought content does not include suicidal ideation. Thought content does not include suicidal plan.         Cognition and Memory: Cognition normal.

## 2025-02-14 NOTE — ASSESSMENT & PLAN NOTE
Depression Screening Follow-up Plan: Patient's depression screening was positive with a PHQ-9 score of 6. Patient with underlying depression and was advised to continue current medications as prescribed. Continue regular follow-up with their psychologist/therapist/psychiatrist who is managing their mental health condition(s).    Orders:    Vitamin B12    Folate; Future    TSH, 3rd generation    T4, free    Vitamin D 25 hydroxy

## 2025-02-15 ENCOUNTER — APPOINTMENT (OUTPATIENT)
Dept: LAB | Facility: HOSPITAL | Age: 40
End: 2025-02-15
Payer: COMMERCIAL

## 2025-02-15 DIAGNOSIS — L65.9 HAIR LOSS: ICD-10-CM

## 2025-02-15 DIAGNOSIS — N92.6 IRREGULAR MENSES: ICD-10-CM

## 2025-02-15 DIAGNOSIS — F32.2 SEVERE DEPRESSION (HCC): ICD-10-CM

## 2025-02-15 LAB
25(OH)D3 SERPL-MCNC: 30 NG/ML (ref 30–100)
FOLATE SERPL-MCNC: >22.3 NG/ML
FSH SERPL-ACNC: 5 MIU/ML
LH SERPL-ACNC: 12.9 MIU/ML
T4 FREE SERPL-MCNC: 0.78 NG/DL (ref 0.61–1.12)
TSH SERPL DL<=0.05 MIU/L-ACNC: 1.12 UIU/ML (ref 0.45–4.5)
VIT B12 SERPL-MCNC: 886 PG/ML (ref 180–914)

## 2025-02-15 PROCEDURE — 82607 VITAMIN B-12: CPT | Performed by: NURSE PRACTITIONER

## 2025-02-15 PROCEDURE — 83002 ASSAY OF GONADOTROPIN (LH): CPT

## 2025-02-15 PROCEDURE — 83001 ASSAY OF GONADOTROPIN (FSH): CPT

## 2025-02-15 PROCEDURE — 36415 COLL VENOUS BLD VENIPUNCTURE: CPT | Performed by: NURSE PRACTITIONER

## 2025-02-15 PROCEDURE — 84443 ASSAY THYROID STIM HORMONE: CPT | Performed by: NURSE PRACTITIONER

## 2025-02-15 PROCEDURE — 84439 ASSAY OF FREE THYROXINE: CPT | Performed by: NURSE PRACTITIONER

## 2025-02-15 PROCEDURE — 82306 VITAMIN D 25 HYDROXY: CPT | Performed by: NURSE PRACTITIONER

## 2025-02-15 PROCEDURE — 82746 ASSAY OF FOLIC ACID SERUM: CPT

## 2025-02-16 ENCOUNTER — RESULTS FOLLOW-UP (OUTPATIENT)
Dept: FAMILY MEDICINE CLINIC | Facility: CLINIC | Age: 40
End: 2025-02-16

## 2025-02-17 ENCOUNTER — OFFICE VISIT (OUTPATIENT)
Dept: OBGYN CLINIC | Facility: CLINIC | Age: 40
End: 2025-02-17

## 2025-02-17 VITALS
BODY MASS INDEX: 30.63 KG/M2 | SYSTOLIC BLOOD PRESSURE: 112 MMHG | HEIGHT: 64 IN | DIASTOLIC BLOOD PRESSURE: 68 MMHG | WEIGHT: 179.4 LBS

## 2025-02-17 DIAGNOSIS — F15.11 HISTORY OF METHAMPHETAMINE ABUSE (HCC): ICD-10-CM

## 2025-02-17 DIAGNOSIS — F41.0 GENERALIZED ANXIETY DISORDER WITH PANIC ATTACKS: ICD-10-CM

## 2025-02-17 DIAGNOSIS — F10.91 ALCOHOL USE, UNSPECIFIED, IN REMISSION: ICD-10-CM

## 2025-02-17 DIAGNOSIS — F41.1 GENERALIZED ANXIETY DISORDER WITH PANIC ATTACKS: ICD-10-CM

## 2025-02-17 DIAGNOSIS — F33.1 MDD (MAJOR DEPRESSIVE DISORDER), RECURRENT EPISODE, MODERATE (HCC): Primary | ICD-10-CM

## 2025-02-17 DIAGNOSIS — F12.90 MARIJUANA USE: ICD-10-CM

## 2025-02-17 DIAGNOSIS — F17.200 TOBACCO USE DISORDER: ICD-10-CM

## 2025-02-17 PROCEDURE — NC001 PR NO CHARGE: Performed by: STUDENT IN AN ORGANIZED HEALTH CARE EDUCATION/TRAINING PROGRAM

## 2025-02-17 RX ORDER — CLONIDINE HYDROCHLORIDE 0.1 MG/1
0.1 TABLET ORAL DAILY
Qty: 30 TABLET | Refills: 2 | Status: SHIPPED | OUTPATIENT
Start: 2025-02-17 | End: 2025-02-17

## 2025-02-17 RX ORDER — CLONIDINE HYDROCHLORIDE 0.1 MG/1
0.1 TABLET ORAL DAILY
Qty: 30 TABLET | Refills: 2 | Status: SHIPPED | OUTPATIENT
Start: 2025-02-17 | End: 2025-05-18

## 2025-02-17 RX ORDER — BUPROPION HYDROCHLORIDE 450 MG/1
450 TABLET, FILM COATED, EXTENDED RELEASE ORAL EVERY MORNING
Qty: 30 TABLET | Refills: 2 | Status: SHIPPED | OUTPATIENT
Start: 2025-02-17 | End: 2025-05-18

## 2025-02-17 RX ORDER — BUPROPION HYDROCHLORIDE 450 MG/1
450 TABLET, FILM COATED, EXTENDED RELEASE ORAL EVERY MORNING
Qty: 30 TABLET | Refills: 2 | Status: SHIPPED | OUTPATIENT
Start: 2025-02-17 | End: 2025-02-17
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
## 2025-02-18 NOTE — RESULT ENCOUNTER NOTE
Your B12, TSH and free T4 are both normal. Your Vitamin D is just normal at 30. I would recommend D3 supplement of 1000iu daily

## 2025-02-19 ENCOUNTER — PATIENT MESSAGE (OUTPATIENT)
Dept: OBGYN CLINIC | Facility: CLINIC | Age: 40
End: 2025-02-19

## 2025-02-19 ENCOUNTER — TELEPHONE (OUTPATIENT)
Age: 40
End: 2025-02-19

## 2025-02-26 ENCOUNTER — OFFICE VISIT (OUTPATIENT)
Dept: OBGYN CLINIC | Facility: CLINIC | Age: 40
End: 2025-02-26
Payer: COMMERCIAL

## 2025-02-26 VITALS
BODY MASS INDEX: 30.56 KG/M2 | SYSTOLIC BLOOD PRESSURE: 116 MMHG | WEIGHT: 179 LBS | DIASTOLIC BLOOD PRESSURE: 74 MMHG | HEIGHT: 64 IN

## 2025-02-26 DIAGNOSIS — Z31.89 ENCOUNTER FOR FERTILITY PLANNING: Primary | ICD-10-CM

## 2025-02-26 PROCEDURE — 99213 OFFICE O/P EST LOW 20 MIN: CPT | Performed by: PHYSICIAN ASSISTANT

## 2025-02-26 NOTE — PROGRESS NOTES
[facility] [place]  [address] [place] [address]    ASSESSMENT/PLAN:     1. Encounter for fertility planning  Assessment & Plan:  Reviewed fertility with patient in length as well as recommendations for consideration of egg freezing.   Reviewed risks of AMA including increased risk of genetic defects, miscarriage, hypertension, preeclampsia, gestational diabetes.   Information for [facility] given to further discuss egg freezing as well as cost. Patient will also look into insurance coverage.       CC:  discuss fertility.     HPI: Sofia Espinoza is a 39 y.o.  who presents for fertility discussion.  Stopped birth control , took about a year for cycles to regulate and since have been regular, every 28 days.     2 episodes of really heavy bleeding. Normally light 3 days but had 2 episodes every heavy changing   Recently went to ER 2025 Second day.     Patient not currently planning for pregnancy but would like to discuss options with age of 39.     ROS: Negative except as noted in HPI    Patient's last menstrual period was 2025 (exact date).       She  reports being sexually active and has had partner(s) who are male. She reports using the following method of birth control/protection: None.       The following portions of the patient's history were reviewed and updated as appropriate:   Past Medical History:   Diagnosis Date    Abnormal Pap smear of cervix      [facility]n [facility]; 2023 NILM pap/neg HPV    Allergic     Seasonal    Anxiety     Depression     hx suicide attempt     GERD (gastroesophageal reflux disease)     Gonorrhea     Herpes 2014    Inable to confirm     Past Surgical History:   Procedure Laterality Date    FRACTURE SURGERY      ORIF DISTAL RADIUS FRACTURE Left 2005    WISDOM TOOTH EXTRACTION       Family History   Problem Relation Age of Onset    Lupus Mother     Hypertension Father     Cancer Maternal Grandfather          leukemia    Breast cancer Neg Hx     Colon cancer Neg Hx     Ovarian cancer Neg Hx      Social History     Socioeconomic History    Marital status: /Civil Union     Spouse name: Not on file    Number of children: Not on file    Years of education: Not on file    Highest education level: Not on file   Occupational History    Not on file   Tobacco Use    Smoking status: Some Days     Current packs/day: 0.25     Average packs/day: 0.3 packs/day for 3.0 years (0.8 ttl pk-yrs)     Types: Cigarettes    Smokeless tobacco: Never    Tobacco comments:     Social smoking and now smoking when anxious/driving   Vaping Use    Vaping status: Never Used   Substance and Sexual Activity    Alcohol use: Not Currently     Alcohol/week: 2.0 standard drinks of alcohol     Types: 2 Standard drinks or equivalent per week     Comment: Was recently sober for 3 months have had a few drinks social    Drug use: Yes     Types: Marijuana, Methamphetamines     Comment: Not currently using meth- sober since February    Sexual activity: Yes     Partners: Male     Birth control/protection: None   Other Topics Concern    Not on file   Social History Narrative    Not on file     Social Drivers of Health     Financial Resource Strain: Low Risk  (2/17/2025)    Overall Financial Resource Strain (CARDIA)     Difficulty of Paying Living Expenses: Not hard at all   Food Insecurity: No Food Insecurity (2/17/2025)    Hunger Vital Sign     Worried About Running Out of Food in the Last Year: Never true     Ran Out of Food in the Last Year: Never true   Transportation Needs: No Transportation Needs (2/17/2025)    PRAPARE - Transportation     Lack of Transportation (Medical): No     Lack of Transportation (Non-Medical): No   Physical Activity: Not on file   Stress: Not on file   Social Connections: Not on file   Intimate Partner Violence: Not on file   Housing Stability: Low Risk  (2/17/2025)    Housing Stability Vital Sign     Unable to Pay for  "Housing in the Last Year: No     Number of Times Moved in the Last Year: 0     Homeless in the Last Year: No     Outpatient Medications Marked as Taking for the 2/26/25 encounter (Office Visit) with Barb Alas PA-C   Medication    buPROPion (FORFIVO XL) 450 MG 24 hr tablet    cloNIDine (Catapres) 0.1 mg tablet    hydrOXYzine pamoate (VISTARIL) 25 mg capsule    melatonin 1 MG CHEW    multivitamin (THERAGRAN) TABS    [DISCONTINUED] tirzepatide (Zepbound) 12.5 mg/0.5 mL auto-injector     Allergies   Allergen Reactions    Doxycycline Photosensitivity     Photosensitivity noted on dorsal forearms bilaterally and dorsal hands/fingers           Objective:  /74 (BP Location: Left arm, Patient Position: Sitting, Cuff Size: Standard)   Ht 5' 4\" (1.626 m)   Wt 81.2 kg (179 lb)   LMP 02/06/2025 (Exact Date)   BMI 30.73 kg/m²          Physical Exam  Constitutional:       Appearance: She is well-developed.   HENT:      Head: Normocephalic and atraumatic.   Neck:      Thyroid: No thyromegaly.   Cardiovascular:      Rate and Rhythm: Normal rate and regular rhythm.      Heart sounds: Normal heart sounds. No murmur heard.     No friction rub. No gallop.   Pulmonary:      Effort: Pulmonary effort is normal. No respiratory distress.      Breath sounds: Normal breath sounds. No wheezing.   Abdominal:      General: There is no distension.      Palpations: Abdomen is soft. There is no mass.      Tenderness: There is no abdominal tenderness. There is no guarding or rebound.      Hernia: No hernia is present.   Lymphadenopathy:      Cervical: No cervical adenopathy.   Neurological:      Mental Status: She is alert and oriented to person, place, and time.   Skin:     General: Skin is warm and dry.   Psychiatric:         Behavior: Behavior normal.             Barb Alas PA-C  3/6/2025 10:18 PM    "

## 2025-03-05 DIAGNOSIS — E66.9 OBESITY (BMI 30-39.9): Primary | ICD-10-CM

## 2025-03-05 RX ORDER — TIRZEPATIDE 15 MG/.5ML
15 INJECTION, SOLUTION SUBCUTANEOUS WEEKLY
Qty: 2 ML | Refills: 0 | Status: SHIPPED | OUTPATIENT
Start: 2025-03-05

## 2025-03-05 RX ORDER — TIRZEPATIDE 12.5 MG/.5ML
12.5 INJECTION, SOLUTION SUBCUTANEOUS WEEKLY
Refills: 0 | OUTPATIENT
Start: 2025-03-05

## 2025-03-05 NOTE — TELEPHONE ENCOUNTER
Spoke with patient advise given , patient ready for next dosage. Also patient asking when she should be due for appointment . 3 month from last appointment or 4 weeks?? Patient was not sure    Please advise

## 2025-03-06 PROBLEM — Z31.89 ENCOUNTER FOR FERTILITY PLANNING: Status: ACTIVE | Noted: 2025-03-06

## 2025-03-07 NOTE — ASSESSMENT & PLAN NOTE
Reviewed fertility with patient in length as well as recommendations for consideration of egg freezing.   Reviewed risks of AMA including increased risk of genetic defects, miscarriage, hypertension, preeclampsia, gestational diabetes.   Information for [facility] given to further discuss egg freezing as well as cost. Patient will also look into insurance coverage.

## 2025-04-01 DIAGNOSIS — E66.9 OBESITY (BMI 30-39.9): ICD-10-CM

## 2025-04-02 RX ORDER — TIRZEPATIDE 15 MG/.5ML
15 INJECTION, SOLUTION SUBCUTANEOUS WEEKLY
Qty: 2 ML | Refills: 1 | Status: SHIPPED | OUTPATIENT
Start: 2025-04-02

## 2025-04-10 ENCOUNTER — TELEPHONE (OUTPATIENT)
Dept: OBGYN CLINIC | Facility: CLINIC | Age: 40
End: 2025-04-10

## 2025-04-28 ENCOUNTER — TELEPHONE (OUTPATIENT)
Dept: FAMILY MEDICINE CLINIC | Facility: CLINIC | Age: 40
End: 2025-04-28

## 2025-04-28 ENCOUNTER — OFFICE VISIT (OUTPATIENT)
Dept: FAMILY MEDICINE CLINIC | Facility: CLINIC | Age: 40
End: 2025-04-28
Payer: COMMERCIAL

## 2025-04-28 VITALS
OXYGEN SATURATION: 100 % | SYSTOLIC BLOOD PRESSURE: 126 MMHG | RESPIRATION RATE: 16 BRPM | WEIGHT: 170.4 LBS | BODY MASS INDEX: 29.09 KG/M2 | TEMPERATURE: 96.8 F | DIASTOLIC BLOOD PRESSURE: 80 MMHG | HEIGHT: 64 IN | HEART RATE: 80 BPM

## 2025-04-28 DIAGNOSIS — L21.9 SEBORRHEIC DERMATITIS: ICD-10-CM

## 2025-04-28 DIAGNOSIS — Z00.00 ANNUAL PHYSICAL EXAM: Primary | ICD-10-CM

## 2025-04-28 DIAGNOSIS — L82.1 SEBORRHEIC KERATOSIS OF SCALP: ICD-10-CM

## 2025-04-28 DIAGNOSIS — E66.9 OBESITY (BMI 30-39.9): ICD-10-CM

## 2025-04-28 PROCEDURE — 99213 OFFICE O/P EST LOW 20 MIN: CPT | Performed by: NURSE PRACTITIONER

## 2025-04-28 PROCEDURE — 99395 PREV VISIT EST AGE 18-39: CPT | Performed by: NURSE PRACTITIONER

## 2025-04-28 RX ORDER — TIRZEPATIDE 15 MG/.5ML
15 INJECTION, SOLUTION SUBCUTANEOUS WEEKLY
Qty: 2 ML | Refills: 1 | Status: SHIPPED | OUTPATIENT
Start: 2025-04-28

## 2025-04-28 RX ORDER — CLOBETASOL PROPIONATE 0.5 MG/ML
SOLUTION TOPICAL 2 TIMES DAILY
Qty: 25 ML | Refills: 1 | Status: SHIPPED | OUTPATIENT
Start: 2025-04-28

## 2025-04-28 NOTE — TELEPHONE ENCOUNTER
PA for (Zepbound) 15 mg/0.5 mL auto-injector SUBMITTED to [facility]    via    []CMM-KEY:   [x]Surescripts-Case ID # [id number]   []Availity-Auth ID # NDC #   []Faxed to plan   []Other website   []Phone call Case ID #     [x]PA sent as URGENT    All office notes, labs and other pertaining documents and studies sent. Clinical questions answered. Awaiting determination from insurance company.     Turnaround time for your insurance to make a decision on your Prior Authorization can take 7-21 business days.

## 2025-04-28 NOTE — PROGRESS NOTES
Adult Annual Physical  Name: Sofia Espinoza      : 1985      MRN: [id number]  Encounter Provider: LEONEL Jo  Encounter Date: 2025   Encounter department: [facility]    :  Assessment & Plan  Annual physical exam         Seborrheic dermatitis  Clobetasol up to twice daily to scalp  Orders:    clobetasol (TEMOVATE) 0.05 % external solution; Apply topically 2 (two) times a day    Seborrheic keratosis of scalp    Orders:    Ambulatory Referral to Dermatology; Future    Obesity (BMI 30-39.9)  Continue zepbound and dietary modifications    Orders:    tirzepatide (Zepbound) 15 mg/0.5 mL auto-injector; Inject 0.5 mL (15 mg total) under the skin once a week        Preventive Screenings:  - Diabetes Screening: screening up-to-date  - Hepatitis C screening: screening up-to-date   - HIV screening: screening up-to-date   - Cervical cancer screening: screening up-to-date   - Lung cancer screening: screening not indicated     Counseling/Anticipatory Guidance:    - Dental health: discussed importance of regular tooth brushing, flossing, and dental visits.   - Sexual health: discussed sexually transmitted diseases, partner selection, use of condoms, avoidance of unintended pregnancy, and contraceptive alternatives.   - Diet: discussed recommendations for a healthy/well-balanced diet.   - Exercise: the importance of regular exercise/physical activity was discussed. Recommend exercise 3-5 times per week for at least 30 minutes.   - Injury prevention: discussed safety/seat belts, safety helmets, smoke detectors, carbon monoxide detectors, and smoking near bedding or upholstery.       Depression Screening and Follow-up Plan: Patient was screened for depression during today's encounter. They screened negative with a PHQ-9 score of 1.          History of Present Illness     Adult Annual Physical:  Patient presents for annual physical.     Diet and Physical Activity:  - Diet/Nutrition: no  "special diet.  - Exercise: no formal exercise.    Depression Screening:    - PHQ-9 Score: 1    General Health:  - Sleep:. sleep is better since her  is back in house but they are not in the same room, she doesnt sleep well when she is alone in house  - Hearing: normal hearing right ear and normal hearing left ear.  - Vision: vision problems.  - Dental: regular dental visits.    /GYN Health:  - Follows with GYN: yes.   - Menopause: premenopausal.     Review of Systems   Constitutional:  Positive for fatigue. Negative for activity change, appetite change, diaphoresis and fever.   HENT: Negative.     Eyes: Negative.    Respiratory:  Negative for cough, choking, chest tightness and shortness of breath.    Cardiovascular:  Positive for palpitations. Negative for chest pain and leg swelling.   Gastrointestinal: Negative.    Endocrine:        Hair loss     Skin: Negative.    Psychiatric/Behavioral:  Positive for dysphoric mood and sleep disturbance. Negative for behavioral problems and suicidal ideas. The patient is nervous/anxious.          Objective   /80 (Patient Position: Sitting, Cuff Size: Standard)   Pulse 80   Temp (!) 96.8 °F (36 °C) (Temporal)   Resp 16   Ht 5' 4\" (1.626 m)   Wt 77.3 kg (170 lb 6.4 oz)   SpO2 100%   BMI 29.25 kg/m²     Physical Exam  Vitals and nursing note reviewed.   Constitutional:       General: She is not in acute distress.     Appearance: Normal appearance. She is well-developed.   HENT:      Head: Normocephalic and atraumatic.      Comments: Hair thinning along crown and sides    Eyes:      General: No scleral icterus.     Conjunctiva/sclera: Conjunctivae normal.      Pupils: Pupils are equal, round, and reactive to light.   Neck:      Thyroid: No thyromegaly or thyroid tenderness.   Cardiovascular:      Rate and Rhythm: Normal rate and regular rhythm.      Pulses:           Radial pulses are 2+ on the right side and 2+ on the left side.      Heart sounds: Normal " heart sounds. No murmur heard.  Pulmonary:      Effort: Pulmonary effort is normal. No respiratory distress.      Breath sounds: Normal breath sounds.   Abdominal:      General: Bowel sounds are normal.      Palpations: Abdomen is soft.      Tenderness: There is no abdominal tenderness.   Musculoskeletal:      Cervical back: Neck supple.      Right lower leg: No edema.      Left lower leg: No edema.   Lymphadenopathy:      Cervical: No cervical adenopathy.   Skin:     General: Skin is warm and dry.   Neurological:      Mental Status: She is alert and oriented to person, place, and time.   Psychiatric:         Attention and Perception: Attention normal.         Mood and Affect: Mood is depressed. Affect is tearful.         Speech: Speech normal.         Behavior: Behavior normal. Behavior is cooperative.         Thought Content: Thought content normal. Thought content does not include suicidal ideation. Thought content does not include suicidal plan.         Cognition and Memory: Cognition normal.

## 2025-04-29 NOTE — TELEPHONE ENCOUNTER
PA for (Zepbound) 15 mg/0.5 mL auto-injector APPROVED     Date(s) approved 04/28/25-04/28/26    Case # [id number]    Patient advised by          []MyChart Message  [x]Phone call   [x]LMOM  []L/M to call office as no active Communication consent on file  []Unable to leave detailed message as VM not approved on Communication consent       Pharmacy advised by    [x]Fax  []Phone call  []Secure Chat    Approval letter scanned into Media Yes

## 2025-05-19 ENCOUNTER — OFFICE VISIT (OUTPATIENT)
Dept: OBGYN CLINIC | Facility: CLINIC | Age: 40
End: 2025-05-19

## 2025-05-19 VITALS
DIASTOLIC BLOOD PRESSURE: 60 MMHG | BODY MASS INDEX: 29.26 KG/M2 | SYSTOLIC BLOOD PRESSURE: 100 MMHG | HEIGHT: 64 IN | WEIGHT: 171.4 LBS

## 2025-05-19 DIAGNOSIS — F33.1 MDD (MAJOR DEPRESSIVE DISORDER), RECURRENT EPISODE, MODERATE (HCC): ICD-10-CM

## 2025-05-19 DIAGNOSIS — F17.200 TOBACCO USE DISORDER: ICD-10-CM

## 2025-05-19 DIAGNOSIS — F15.11 HISTORY OF METHAMPHETAMINE ABUSE (HCC): ICD-10-CM

## 2025-05-19 DIAGNOSIS — F41.1 GENERALIZED ANXIETY DISORDER WITH PANIC ATTACKS: Primary | ICD-10-CM

## 2025-05-19 DIAGNOSIS — F41.0 GENERALIZED ANXIETY DISORDER WITH PANIC ATTACKS: Primary | ICD-10-CM

## 2025-05-19 DIAGNOSIS — F10.91 ALCOHOL USE, UNSPECIFIED, IN REMISSION: ICD-10-CM

## 2025-05-19 DIAGNOSIS — F12.90 MARIJUANA USE: ICD-10-CM

## 2025-05-19 PROCEDURE — NC001 PR NO CHARGE: Performed by: STUDENT IN AN ORGANIZED HEALTH CARE EDUCATION/TRAINING PROGRAM

## 2025-05-19 RX ORDER — BUPROPION HYDROCHLORIDE 450 MG/1
450 TABLET, FILM COATED, EXTENDED RELEASE ORAL EVERY MORNING
Qty: 30 TABLET | Refills: 2 | Status: SHIPPED | OUTPATIENT
Start: 2025-05-19 | End: 2025-08-17
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
## 2025-05-27 ENCOUNTER — TELEPHONE (OUTPATIENT)
Dept: PSYCHIATRY | Facility: CLINIC | Age: 40
End: 2025-05-27

## 2025-05-27 DIAGNOSIS — E66.9 OBESITY (BMI 30-39.9): ICD-10-CM

## 2025-05-27 DIAGNOSIS — L21.9 SEBORRHEIC DERMATITIS: ICD-10-CM

## 2025-05-27 NOTE — TELEPHONE ENCOUNTER
One week follow up call for New Patient appointment with   Tamica Meier DO   on 08/15/2025 was made on 05/27/2025. Writer informed patient of New Patient paperwork needing to be completed 5 days prior to the appointment. Writer confirmed paperwork has been sent via [employer].    Appointment was made on: 05/20/2025

## 2025-05-28 RX ORDER — TIRZEPATIDE 15 MG/.5ML
15 INJECTION, SOLUTION SUBCUTANEOUS WEEKLY
Qty: 2 ML | Refills: 0 | Status: SHIPPED | OUTPATIENT
Start: 2025-05-28

## 2025-05-28 RX ORDER — CLOBETASOL PROPIONATE 0.5 MG/ML
SOLUTION TOPICAL 2 TIMES DAILY
Qty: 25 ML | Refills: 0 | Status: SHIPPED | OUTPATIENT
Start: 2025-05-28

## 2025-06-26 DIAGNOSIS — E66.9 OBESITY (BMI 30-39.9): ICD-10-CM

## 2025-06-26 DIAGNOSIS — L21.9 SEBORRHEIC DERMATITIS: ICD-10-CM

## 2025-06-27 RX ORDER — CLOBETASOL PROPIONATE 0.5 MG/ML
SOLUTION TOPICAL 2 TIMES DAILY
Qty: 25 ML | Refills: 0 | Status: SHIPPED | OUTPATIENT
Start: 2025-06-27

## 2025-06-27 RX ORDER — TIRZEPATIDE 15 MG/.5ML
15 INJECTION, SOLUTION SUBCUTANEOUS WEEKLY
Qty: 2 ML | Refills: 0 | Status: SHIPPED | OUTPATIENT
Start: 2025-06-27

## 2025-07-25 DIAGNOSIS — E66.9 OBESITY (BMI 30-39.9): ICD-10-CM

## 2025-07-28 RX ORDER — TIRZEPATIDE 15 MG/.5ML
15 INJECTION, SOLUTION SUBCUTANEOUS WEEKLY
Qty: 2 ML | Refills: 0 | Status: SHIPPED | OUTPATIENT
Start: 2025-07-28

## 2025-08-06 ENCOUNTER — OFFICE VISIT (OUTPATIENT)
Dept: FAMILY MEDICINE CLINIC | Facility: CLINIC | Age: 40
End: 2025-08-06
Payer: COMMERCIAL

## 2025-08-06 VITALS
WEIGHT: 162.8 LBS | BODY MASS INDEX: 27.94 KG/M2 | DIASTOLIC BLOOD PRESSURE: 70 MMHG | OXYGEN SATURATION: 98 % | TEMPERATURE: 97.6 F | HEART RATE: 89 BPM | SYSTOLIC BLOOD PRESSURE: 118 MMHG

## 2025-08-06 DIAGNOSIS — M54.9 ACUTE BILATERAL BACK PAIN, UNSPECIFIED BACK LOCATION: Primary | ICD-10-CM

## 2025-08-06 PROCEDURE — 99213 OFFICE O/P EST LOW 20 MIN: CPT

## 2025-08-06 RX ORDER — CYCLOBENZAPRINE HCL 5 MG
5 TABLET ORAL 3 TIMES DAILY PRN
Qty: 30 TABLET | Refills: 0 | Status: SHIPPED | OUTPATIENT
Start: 2025-08-06

## 2025-08-06 RX ORDER — KETOROLAC TROMETHAMINE 10 MG/1
10 TABLET, FILM COATED ORAL EVERY 6 HOURS PRN
Qty: 30 TABLET | Refills: 0 | Status: SHIPPED | OUTPATIENT
Start: 2025-08-06

## 2025-08-15 ENCOUNTER — OFFICE VISIT (OUTPATIENT)
Dept: PSYCHIATRY | Facility: CLINIC | Age: 40
End: 2025-08-15

## 2025-08-19 DIAGNOSIS — E66.9 OBESITY (BMI 30-39.9): ICD-10-CM

## 2025-08-19 DIAGNOSIS — L21.9 SEBORRHEIC DERMATITIS: ICD-10-CM

## 2025-08-21 RX ORDER — CLOBETASOL PROPIONATE 0.5 MG/ML
SOLUTION TOPICAL 2 TIMES DAILY
Qty: 25 ML | Refills: 0 | Status: SHIPPED | OUTPATIENT
Start: 2025-08-21

## 2025-08-21 RX ORDER — TIRZEPATIDE 15 MG/.5ML
15 INJECTION, SOLUTION SUBCUTANEOUS WEEKLY
Qty: 2 ML | Refills: 0 | Status: SHIPPED | OUTPATIENT
Start: 2025-08-21